# Patient Record
Sex: MALE
[De-identification: names, ages, dates, MRNs, and addresses within clinical notes are randomized per-mention and may not be internally consistent; named-entity substitution may affect disease eponyms.]

---

## 2017-03-16 ENCOUNTER — CHARTING TRANS (OUTPATIENT)
Dept: OTHER | Age: 72
End: 2017-03-16

## 2018-11-05 VITALS
SYSTOLIC BLOOD PRESSURE: 138 MMHG | WEIGHT: 290 LBS | TEMPERATURE: 98.4 F | HEIGHT: 70 IN | BODY MASS INDEX: 41.52 KG/M2 | RESPIRATION RATE: 20 BRPM | OXYGEN SATURATION: 98 % | HEART RATE: 76 BPM | DIASTOLIC BLOOD PRESSURE: 70 MMHG

## 2019-10-11 ENCOUNTER — APPOINTMENT (OUTPATIENT)
Dept: CT IMAGING | Age: 74
End: 2019-10-11
Attending: EMERGENCY MEDICINE
Payer: MEDICARE

## 2019-10-11 ENCOUNTER — APPOINTMENT (OUTPATIENT)
Dept: GENERAL RADIOLOGY | Age: 74
End: 2019-10-11
Attending: EMERGENCY MEDICINE
Payer: MEDICARE

## 2019-10-11 ENCOUNTER — HOSPITAL ENCOUNTER (EMERGENCY)
Age: 74
Discharge: HOME | End: 2019-10-11
Attending: EMERGENCY MEDICINE
Payer: MEDICARE

## 2019-10-11 VITALS
HEIGHT: 70 IN | HEART RATE: 74 BPM | OXYGEN SATURATION: 97 % | WEIGHT: 298 LBS | BODY MASS INDEX: 42.66 KG/M2 | SYSTOLIC BLOOD PRESSURE: 133 MMHG | DIASTOLIC BLOOD PRESSURE: 85 MMHG | RESPIRATION RATE: 16 BRPM

## 2019-10-11 DIAGNOSIS — S20.221A CONTUSION OF RIGHT SIDE OF BACK, INITIAL ENCOUNTER: Primary | ICD-10-CM

## 2019-10-11 PROCEDURE — 71101 X-RAY EXAM UNILAT RIBS/CHEST: CPT | Performed by: EMERGENCY MEDICINE

## 2019-10-11 PROCEDURE — 85730 THROMBOPLASTIN TIME PARTIAL: CPT | Performed by: EMERGENCY MEDICINE

## 2019-10-11 PROCEDURE — 85610 PROTHROMBIN TIME: CPT | Performed by: EMERGENCY MEDICINE

## 2019-10-11 PROCEDURE — 96374 THER/PROPH/DIAG INJ IV PUSH: CPT

## 2019-10-11 PROCEDURE — 71260 CT THORAX DX C+: CPT | Performed by: EMERGENCY MEDICINE

## 2019-10-11 PROCEDURE — 99285 EMERGENCY DEPT VISIT HI MDM: CPT

## 2019-10-11 PROCEDURE — 81003 URINALYSIS AUTO W/O SCOPE: CPT | Performed by: EMERGENCY MEDICINE

## 2019-10-11 PROCEDURE — 80053 COMPREHEN METABOLIC PANEL: CPT | Performed by: EMERGENCY MEDICINE

## 2019-10-11 PROCEDURE — 85025 COMPLETE CBC W/AUTO DIFF WBC: CPT | Performed by: EMERGENCY MEDICINE

## 2019-10-11 PROCEDURE — 96376 TX/PRO/DX INJ SAME DRUG ADON: CPT

## 2019-10-11 PROCEDURE — 96375 TX/PRO/DX INJ NEW DRUG ADDON: CPT

## 2019-10-11 PROCEDURE — 74177 CT ABD & PELVIS W/CONTRAST: CPT | Performed by: EMERGENCY MEDICINE

## 2019-10-11 RX ORDER — DIAZEPAM 5 MG/1
5 TABLET ORAL EVERY 6 HOURS PRN
COMMUNITY
End: 2019-10-23

## 2019-10-11 RX ORDER — KETOROLAC TROMETHAMINE 10 MG/1
10 TABLET, FILM COATED ORAL EVERY 6 HOURS PRN
Qty: 20 TABLET | Refills: 0 | Status: SHIPPED | OUTPATIENT
Start: 2019-10-11 | End: 2019-10-18

## 2019-10-11 RX ORDER — HYDROMORPHONE HYDROCHLORIDE 1 MG/ML
0.5 INJECTION, SOLUTION INTRAMUSCULAR; INTRAVENOUS; SUBCUTANEOUS EVERY 30 MIN PRN
Status: DISCONTINUED | OUTPATIENT
Start: 2019-10-11 | End: 2019-10-11

## 2019-10-11 RX ORDER — KETOROLAC TROMETHAMINE 30 MG/ML
15 INJECTION, SOLUTION INTRAMUSCULAR; INTRAVENOUS ONCE
Status: COMPLETED | OUTPATIENT
Start: 2019-10-11 | End: 2019-10-11

## 2019-10-11 RX ORDER — ONDANSETRON 2 MG/ML
4 INJECTION INTRAMUSCULAR; INTRAVENOUS ONCE
Status: COMPLETED | OUTPATIENT
Start: 2019-10-11 | End: 2019-10-11

## 2019-10-11 RX ORDER — HYDROCODONE BITARTRATE AND ACETAMINOPHEN 10; 325 MG/1; MG/1
1 TABLET ORAL EVERY 6 HOURS PRN
Qty: 20 TABLET | Refills: 0 | Status: SHIPPED | OUTPATIENT
Start: 2019-10-11 | End: 2019-10-18

## 2019-10-11 RX ORDER — METHYLPREDNISOLONE 4 MG/1
TABLET ORAL
Qty: 1 PACKAGE | Refills: 0 | Status: SHIPPED | OUTPATIENT
Start: 2019-10-11 | End: 2019-10-23

## 2019-10-11 NOTE — ED PROVIDER NOTES
Patient Seen in: THE [facility] Emergency Department In [place]      History   Patient presents with:  Back Pain (musculoskeletal)    Stated Complaint: back pain after fall last yesterday     HPI    Patient presents with back pain after a fall.   The patient s Alcohol use: Yes      Alcohol/week: 12.0 standard drinks      Types: 12 Standard drinks or equivalent per week      Frequency: 2-3 times a week    Drug use:  No             Review of Systems    Positive for stated complaint: back pain after fall last yester limits   PTT, ACTIVATED - Abnormal; Notable for the following components:    PTT 37.8 (*)     All other components within normal limits   CBC WITH DIFFERENTIAL WITH PLATELET    Narrative:      The following orders were created for panel order CBC WITH DIFFE visible pulmonary arterial thrombus or attenuation. ABDOMEN/PELVIS: LIVER:  No enlargement, atrophy, abnormal density, or significant focal lesion. BILIARY:  No visible dilatation or calcification.   PANCREAS:  No lesion, fluid collection, ductal dilatat pain since falling. FINDINGS:  Wall normal heart size and pulmonary vascularity. Mildly tortuous and calcified aorta. No focal pulmonary opacity. Thoracic dextroscoliosis and degenerative changes. No acute right rib fracture or osseous abnormality. needed for stability with ambulation. He was offered observation admission but would prefer to go home. He has an appointment with Dr. Sherley Rain on Tuesday already for a follow-up. He will return here in the meantime for new or worsening symptoms.      Dis

## 2019-10-11 NOTE — ED INITIAL ASSESSMENT (HPI)
Pt states yest tripped in yard over unequal sidewalk co upper back pain stefani with movement and pain occ across upper chest

## 2019-10-23 ENCOUNTER — APPOINTMENT (OUTPATIENT)
Dept: GENERAL RADIOLOGY | Age: 74
End: 2019-10-23
Attending: EMERGENCY MEDICINE
Payer: MEDICARE

## 2019-10-23 ENCOUNTER — HOSPITAL ENCOUNTER (EMERGENCY)
Age: 74
Discharge: HOME | End: 2019-10-23
Attending: EMERGENCY MEDICINE
Payer: MEDICARE

## 2019-10-23 ENCOUNTER — APPOINTMENT (OUTPATIENT)
Dept: ULTRASOUND IMAGING | Age: 74
End: 2019-10-23
Attending: EMERGENCY MEDICINE
Payer: MEDICARE

## 2019-10-23 VITALS
BODY MASS INDEX: 41.37 KG/M2 | DIASTOLIC BLOOD PRESSURE: 70 MMHG | RESPIRATION RATE: 23 BRPM | WEIGHT: 289 LBS | HEIGHT: 70 IN | TEMPERATURE: 97 F | SYSTOLIC BLOOD PRESSURE: 127 MMHG | OXYGEN SATURATION: 100 % | HEART RATE: 85 BPM

## 2019-10-23 DIAGNOSIS — M79.89 LEFT LEG SWELLING: Primary | ICD-10-CM

## 2019-10-23 DIAGNOSIS — S72.492A AVULSION FRACTURE OF FEMORAL CONDYLE, LEFT, CLOSED, INITIAL ENCOUNTER (HCC): ICD-10-CM

## 2019-10-23 PROCEDURE — 99284 EMERGENCY DEPT VISIT MOD MDM: CPT

## 2019-10-23 PROCEDURE — 96365 THER/PROPH/DIAG IV INF INIT: CPT

## 2019-10-23 PROCEDURE — 73552 X-RAY EXAM OF FEMUR 2/>: CPT | Performed by: EMERGENCY MEDICINE

## 2019-10-23 PROCEDURE — 80048 BASIC METABOLIC PNL TOTAL CA: CPT | Performed by: EMERGENCY MEDICINE

## 2019-10-23 PROCEDURE — 73590 X-RAY EXAM OF LOWER LEG: CPT | Performed by: EMERGENCY MEDICINE

## 2019-10-23 PROCEDURE — 36415 COLL VENOUS BLD VENIPUNCTURE: CPT

## 2019-10-23 PROCEDURE — 73560 X-RAY EXAM OF KNEE 1 OR 2: CPT | Performed by: EMERGENCY MEDICINE

## 2019-10-23 PROCEDURE — 85025 COMPLETE CBC W/AUTO DIFF WBC: CPT | Performed by: EMERGENCY MEDICINE

## 2019-10-23 PROCEDURE — 85610 PROTHROMBIN TIME: CPT | Performed by: EMERGENCY MEDICINE

## 2019-10-23 PROCEDURE — 87040 BLOOD CULTURE FOR BACTERIA: CPT | Performed by: EMERGENCY MEDICINE

## 2019-10-23 PROCEDURE — 93971 EXTREMITY STUDY: CPT | Performed by: EMERGENCY MEDICINE

## 2019-10-23 RX ORDER — CEPHALEXIN 500 MG/1
500 CAPSULE ORAL 4 TIMES DAILY
Qty: 28 CAPSULE | Refills: 0 | Status: SHIPPED | OUTPATIENT
Start: 2019-10-23 | End: 2019-10-30

## 2019-10-23 RX ORDER — PHENOBARBITAL SODIUM 130 MG/ML
300 INJECTION INTRAMUSCULAR ONCE
Status: DISCONTINUED | OUTPATIENT
Start: 2019-10-23 | End: 2019-10-23

## 2019-10-23 NOTE — ED INITIAL ASSESSMENT (HPI)
Pt was sent over from Dr. Melba Juarez office to R/O DVT to left leg. Pt fell 10/9/19 was taken by ambulance to [address]. xrays of hip and ribs at time of injury.

## 2019-10-23 NOTE — ED PROVIDER NOTES
Patient Seen in: THE [facility] Emergency Department In [place]      History   Patient presents with:  Deep Vein Thrombosis (cardiovascular)    Stated Complaint: SENT FROM PRIMARY MD FOR STAT LEFT LEG [place]     HPI    I note an ER visit from about just less th 131.1 kg   SpO2 100%   BMI 41.47 kg/m²         Physical Exam  General: The patient is awake, alert, conversant. Patient answers questions quickly and appropriately.   Eyes: sclera white, conjunctiva pink and moist.  Lids and lashes are normal.  Nose: Unrem Abnormality         Status                     ---------                               -----------         ------                     CBC W/ DIFFERENTIAL[[id number]]          Abnormal            Final result                 Pleas dislocation. CBC shows normal white count, hemoglobin, and platelets with no predominance of neutrophils on differential.  Metabolic panel shows mild hyponatremia. There is normal creatinine.   INR therapeutic at 2.07    Venous Doppler leg  CONCLUSION:

## 2021-06-02 ENCOUNTER — HOSPITAL ENCOUNTER (EMERGENCY)
Age: 76
Discharge: HOME | End: 2021-06-02
Attending: EMERGENCY MEDICINE
Payer: MEDICARE

## 2021-06-02 VITALS
SYSTOLIC BLOOD PRESSURE: 156 MMHG | DIASTOLIC BLOOD PRESSURE: 87 MMHG | HEART RATE: 97 BPM | RESPIRATION RATE: 20 BRPM | WEIGHT: 290 LBS | BODY MASS INDEX: 41.52 KG/M2 | TEMPERATURE: 98 F | HEIGHT: 70 IN | OXYGEN SATURATION: 96 %

## 2021-06-02 DIAGNOSIS — L03.115 CELLULITIS OF RIGHT LOWER EXTREMITY: Primary | ICD-10-CM

## 2021-06-02 PROCEDURE — 99283 EMERGENCY DEPT VISIT LOW MDM: CPT

## 2021-06-02 RX ORDER — CEFADROXIL 500 MG/1
500 CAPSULE ORAL 2 TIMES DAILY
Qty: 20 CAPSULE | Refills: 0 | Status: SHIPPED | OUTPATIENT
Start: 2021-06-02 | End: 2021-06-12

## 2021-06-02 NOTE — ED PROVIDER NOTES
Patient Seen in: [facility] Emergency Department In [facility]      History   Patient presents with:  Cellulitis  Fall    Stated Complaint: fell on friday, now redness to shin and leg spreading    HPI/Subjective:   HPI    Emily Martinez is a 50-year-old male who pres Alcohol/week: 12.0 standard drinks      Types: 12 Standard drinks or equivalent per week    Drug use:  No             Review of Systems    Positive for stated complaint: fell on friday, now redness to shin and leg spreading  Other systems are as noted in HP per the patient is sitting up on exam cart, is nontoxic in appearance and is in no acute distress. Lower extremities evaluated and obvious cellulitis is noted without any tracking or other concerning findings. Distal CMS intact.       This case is discuss

## 2021-06-02 NOTE — ED INITIAL ASSESSMENT (HPI)
States fell on Friday and struck right lower leg/shin on edge of golfcart. No loc today noted increased redness to lower let. Right lower leg is red, warm to touch, large scabbed area noted.

## 2021-08-18 PROBLEM — I48.21 PERMANENT ATRIAL FIBRILLATION (HCC): Status: ACTIVE | Noted: 2021-08-18

## 2021-08-18 PROBLEM — I10 ESSENTIAL HYPERTENSION: Status: ACTIVE | Noted: 2021-08-18

## 2024-01-13 ENCOUNTER — APPOINTMENT (OUTPATIENT)
Dept: GENERAL RADIOLOGY | Age: 79
End: 2024-01-13
Payer: MEDICARE

## 2024-01-13 ENCOUNTER — HOSPITAL ENCOUNTER (EMERGENCY)
Age: 79
Discharge: HOME | End: 2024-01-13
Payer: MEDICARE

## 2024-01-13 VITALS
TEMPERATURE: 98 F | HEIGHT: 70 IN | WEIGHT: 280 LBS | BODY MASS INDEX: 40.09 KG/M2 | DIASTOLIC BLOOD PRESSURE: 71 MMHG | OXYGEN SATURATION: 95 % | SYSTOLIC BLOOD PRESSURE: 126 MMHG | HEART RATE: 64 BPM | RESPIRATION RATE: 16 BRPM

## 2024-01-13 DIAGNOSIS — U07.1 COVID-19 VIRUS INFECTION: Primary | ICD-10-CM

## 2024-01-13 LAB
POCT INFLUENZA A: NEGATIVE
POCT INFLUENZA B: NEGATIVE
SARS-COV-2 RNA RESP QL NAA+PROBE: DETECTED

## 2024-01-13 PROCEDURE — 71046 X-RAY EXAM CHEST 2 VIEWS: CPT

## 2024-01-13 PROCEDURE — 87502 INFLUENZA DNA AMP PROBE: CPT

## 2024-01-13 PROCEDURE — 99283 EMERGENCY DEPT VISIT LOW MDM: CPT

## 2024-01-13 NOTE — ED PROVIDER NOTES
History     Chief Complaint   Patient presents with    Cough/URI       Subjective:   HPI    Adi Thomas, 78 year old male with notable medical history of ENDY, HTN who presents with nasal congestion and mild HA. PATIENT reports s/s started a couple days ago and he reports his wife has had similar symptoms. Denies fever, chills, cough, SOB, n/v/d, severe fatigue.         Objective:   Past Medical History:   Diagnosis Date    Cellulitis     Chronic venous insufficiency     Essential hypertension     Gout     Obesity     PAD (peripheral artery disease) (HCC)     Paroxysmal atrial fibrillation (HCC)     Sleep apnea               Past Surgical History:   Procedure Laterality Date    APPENDECTOMY      CATARACT Bilateral     HIP REPLACEMENT SURGERY Left 2014    KNEE REPLACEMENT SURGERY Right 2001    KNEE REPLACEMENT SURGERY Left 2010                Social History     Socioeconomic History    Marital status:    Tobacco Use    Smoking status: Former     Types: Cigarettes     Quit date:      Years since quittin.0    Smokeless tobacco: Never   Substance and Sexual Activity    Alcohol use: Yes     Alcohol/week: 12.0 standard drinks of alcohol     Types: 12 Standard drinks or equivalent per week    Drug use: No              Review of Systems   Constitutional:  Negative for chills and fever.   HENT:  Positive for congestion.    Respiratory:  Negative for shortness of breath.    All other systems reviewed and are negative.        Constitutional and vital signs reviewed.      All other systems reviewed and negative except as noted above.    I have reviewed the family history, social history, allergies, and outpatient medications.     History reviewed from EMR: Encounters, problem list, allergies, medications      Physical Exam     ED Triage Vitals [24 1344]   /71   Pulse 64   Resp 16   Temp 98.4 °F (36.9 °C)   Temp src Temporal   SpO2 95 %   O2 Device None (Room air)       Current:/71    Pulse 64   Temp 98.4 °F (36.9 °C) (Temporal)   Resp 16   Ht 177.8 cm (5' 10\")   Wt 127 kg   SpO2 95%   BMI 40.18 kg/m²       Physical Exam  Vitals and nursing note reviewed.   Constitutional:       General: He is not in acute distress.     Appearance: Normal appearance. He is morbidly obese. He is not ill-appearing or toxic-appearing.   HENT:      Head: Normocephalic and atraumatic.      Right Ear: External ear normal.      Left Ear: External ear normal.      Nose: Nose normal. No congestion or rhinorrhea.      Mouth/Throat:      Mouth: Mucous membranes are moist.   Eyes:      Extraocular Movements: Extraocular movements intact.      Conjunctiva/sclera: Conjunctivae normal.      Pupils: Pupils are equal, round, and reactive to light.   Cardiovascular:      Rate and Rhythm: Normal rate and regular rhythm.      Pulses: Normal pulses.   Pulmonary:      Effort: Pulmonary effort is normal. No respiratory distress.      Breath sounds: No wheezing or rhonchi.      Comments: LS clear with mildly diminished sounds to LLL compared to others  Musculoskeletal:         General: No swelling, tenderness or signs of injury. Normal range of motion.      Cervical back: Normal range of motion.   Skin:     General: Skin is warm and dry.      Capillary Refill: Capillary refill takes less than 2 seconds.   Neurological:      General: No focal deficit present.      Mental Status: He is alert and oriented to person, place, and time. Mental status is at baseline.   Psychiatric:         Mood and Affect: Mood normal.         Behavior: Behavior normal. Behavior is cooperative.         Thought Content: Thought content normal.         Judgment: Judgment normal.            ED Course     Labs Reviewed   RAPID SARS-COV-2 BY PCR - Abnormal; Notable for the following components:       Result Value    Rapid SARS-CoV-2 by PCR Detected (*)     All other components within normal limits   POCT FLU TEST - Normal    Narrative:     This assay is a  rapid molecular in vitro test utilizing nucleic acid amplification of influenza A and B viral RNA.     XR CHEST PA + LAT CHEST (CPT=71046)   Final Result   PROCEDURE:  XR CHEST PA + LAT CHEST (CPT=71046)       INDICATIONS:  chest congestion       COMPARISON:  [facility], XR, XR RIBS WITH CHEST (3 VIEWS), RIGHT     (CPT=71101), 10/11/2019, 5:18 PM.       TECHNIQUE:  PA and lateral chest radiographs were obtained.       PATIENT STATED HISTORY: (As transcribed by Technologist)  Patient states    cough and congestion without a fever for the past three days. Patient    states no known history of any heart or lung medical conditions that he is    aware of.             FINDINGS:     LUNGS:  No focal consolidation.  Normal vascularity.   CARDIAC:  Normal size cardiac silhouette.   MEDIASTINUM:  Normal.   PLEURA:  Normal.  No pleural effusions.   BONES:  Normal for age.                         =====   CONCLUSION:  Negative exam.           LOCATION:  [facility]           Dictated by (CST): George Bucio DO on 1/13/2024 at 2:49 PM        Finalized by (CST): George Bucio DO on 1/13/2024 at 2:50 PM             Vitals:    01/13/24 1343 01/13/24 1344   BP:  126/71   Pulse:  64   Resp:  16   Temp:  98.4 °F (36.9 °C)   TempSrc:  Temporal   SpO2:  95%   Weight: 127 kg    Height: 177.8 cm (5' 10\")             ZOILA        Adi Thomas, 78 year old male with medical history as noted above who presents with nasal congestion and mild HA   - Patient in NAD, VSS   - HPI and exam c/w viral etiology   - Covid positive   - CXR ordered to r/o additional pulmonary process   - Discussed pros / cons of Paxlovid and via shared decision making will not prescribe        ** See ED course for additional information on care provided / interventions / notable events throughout patient's encounter.    ** See below for home care instructions (if applicable)    ED Course as of 01/13/24 1545  ------------------------------------------------------------  Time:  01/13 1529  Comment: CXR w/o acute process  Supportive care and infection control measures discussed  F/u with primary care provider as needed  Ambulatory out of ED with son with steady gait       I have independently reviewed the radiology images, clinical lab results, and ECG tracings as described above (if applicable)        Medical Decision Making  Amount and/or Complexity of Data Reviewed  Labs: ordered. Decision-making details documented in ED Course.  Radiology: ordered and independent interpretation performed. Decision-making details documented in ED Course.    Risk  OTC drugs.        Disposition and Plan     Clinical Impression:  1. COVID-19 virus infection         Disposition:  Discharge  1/13/2024  3:29 pm    Follow-up:  Kvng Eid MD  [id number]  [facility] [id number] [phone]    Follow up  As needed          Medications Prescribed:  Discharge Medication List as of 1/13/2024  3:33 PM          The above patient (and/or guardian) was made aware that an appropriate evaluation has been performed, and that no additional testing is required at this time. In my medical judgment, there is currently no evidence of an immediate life-threatening or surgical condition, therefore discharge is indicated at this time. The patient (and/or guardian) was advised that a small risk still exists that a serious condition could develop. The patient was instructed to arrange close follow-up with their primary care provider (or the referral provider given today). The patient received written and verbal instructions regarding their condition / concerns, demonstrated understanding, and is agreement with the outpatient treatment plan.        Home care instructions:      Upper respiratory infection supportive care measures to try as applicable:  General:   - Wash hands often   - Disinfect your environment, linens, electronics, etc.   - Drink plenty of fluids (water, Pedialyte, etc.)   - Get plenty of rest and sleep  with head elevated to help with sinus drainage and throat irritation   - Avoid having air blow on your face as this can worsen congestion / cough   - Do not share utensils or drinks   - Alternate Ibuprofen (adult: 600mg) and Tylenol (adult: 650-1000mg) as needed for pain / body aches / fever   - Symptoms may take a few weeks to resolve   - You may benefit from taking a daily multivitamin   - You may benefit from Zinc (~20mg) a couple times a week   - You may benefit from Vitamin D daily (~2000u)   - Change toothbrush    Sore throat:   - Salt water gargles throughout the day for sore throat   - Cepacol lozenges as needed for sore throat    Cough / Sinus:   - You may benefit from spoonfuls (and/or added to warm drinks) of honey throughout the day for cough   - You may benefit from taking a decongestant (e.g. Sudafed - pseudoephedrine [behind the pharmacy counter]) (may temporarily elevate your heart rate and blood pressure)   - You may benefit from using a humidifier and/or steam showers   - You may benefit from Flonase nasal spray daily   - You may benefit from taking a daily allergy medication (e.g. Zyrtec, Xyzal, etc.)   - You may benefit from boiling water with lemon and cayenne pepper, then breathing in the steam (you can cover your head with a towel to help funnel the steam)      Flaquito Cordova, DNP, APRN, AGACNP-BC, FNP-C, CNL  Adult-Gerontology Acute Care & Family Nurse Practitioner  [facility]

## 2024-01-13 NOTE — DISCHARGE INSTRUCTIONS
Upper respiratory infection supportive care measures to try as applicable:  General:   - Wash hands often   - Disinfect your environment, linens, electronics, etc.   - Drink plenty of fluids (water, Pedialyte, etc.)   - Get plenty of rest and sleep with head elevated to help with sinus drainage and throat irritation   - Avoid having air blow on your face as this can worsen congestion / cough   - Do not share utensils or drinks   - Alternate Ibuprofen (adult: 600mg) and Tylenol (adult: 650-1000mg) as needed for pain / body aches / fever   - Symptoms may take a few weeks to resolve   - You may benefit from taking a daily multivitamin   - You may benefit from Zinc (~20mg) a couple times a week   - You may benefit from Vitamin D daily (~2000u)   - Change toothbrush    Sore throat:   - Salt water gargles throughout the day for sore throat   - Cepacol lozenges as needed for sore throat    Cough / Sinus:   - You may benefit from spoonfuls (and/or added to warm drinks) of honey throughout the day for cough   - You may benefit from taking a decongestant (e.g. Sudafed - pseudoephedrine [behind the pharmacy counter]) (may temporarily elevate your heart rate and blood pressure)   - You may benefit from using a humidifier and/or steam showers   - You may benefit from Flonase nasal spray daily   - You may benefit from taking a daily allergy medication (e.g. Zyrtec, Xyzal, etc.)   - You may benefit from boiling water with lemon and cayenne pepper, then breathing in the steam (you can cover your head with a towel to help funnel the steam)

## 2024-12-29 ENCOUNTER — HOSPITAL ENCOUNTER (OUTPATIENT)
Facility: HOSPITAL | Age: 79
Setting detail: OBSERVATION
Discharge: HOME | End: 2024-12-31
Attending: EMERGENCY MEDICINE | Admitting: HOSPITALIST
Payer: MEDICARE

## 2024-12-29 ENCOUNTER — APPOINTMENT (OUTPATIENT)
Dept: GENERAL RADIOLOGY | Age: 79
End: 2024-12-29
Attending: EMERGENCY MEDICINE
Payer: MEDICARE

## 2024-12-29 DIAGNOSIS — R07.9 CHEST PAIN OF UNCERTAIN ETIOLOGY: Primary | ICD-10-CM

## 2024-12-29 PROBLEM — R73.9 HYPERGLYCEMIA: Status: ACTIVE | Noted: 2024-12-29

## 2024-12-29 PROBLEM — R79.89 AZOTEMIA: Status: ACTIVE | Noted: 2024-12-29

## 2024-12-29 LAB
ALBUMIN SERPL-MCNC: 4.3 G/DL (ref 3.2–4.8)
ALBUMIN/GLOB SERPL: 1.6 {RATIO} (ref 1–2)
ALP LIVER SERPL-CCNC: 88 U/L
ALT SERPL-CCNC: 18 U/L
ANION GAP SERPL CALC-SCNC: 6 MMOL/L (ref 0–18)
AST SERPL-CCNC: 23 U/L (ref ?–34)
BASOPHILS # BLD AUTO: 0.03 X10(3) UL (ref 0–0.2)
BASOPHILS NFR BLD AUTO: 0.4 %
BILIRUB SERPL-MCNC: 0.5 MG/DL (ref 0.2–1.1)
BUN BLD-MCNC: 19 MG/DL (ref 9–23)
CALCIUM BLD-MCNC: 9.3 MG/DL (ref 8.7–10.4)
CHLORIDE SERPL-SCNC: 104 MMOL/L (ref 98–112)
CO2 SERPL-SCNC: 29 MMOL/L (ref 21–32)
CREAT BLD-MCNC: 0.88 MG/DL
EGFRCR SERPLBLD CKD-EPI 2021: 87 ML/MIN/1.73M2 (ref 60–?)
EOSINOPHIL # BLD AUTO: 0.13 X10(3) UL (ref 0–0.7)
EOSINOPHIL NFR BLD AUTO: 1.6 %
ERYTHROCYTE [DISTWIDTH] IN BLOOD BY AUTOMATED COUNT: 14.4 %
GLOBULIN PLAS-MCNC: 2.7 G/DL (ref 2–3.5)
GLUCOSE BLD-MCNC: 120 MG/DL (ref 70–99)
HCT VFR BLD AUTO: 42.6 %
HGB BLD-MCNC: 15 G/DL
IMM GRANULOCYTES # BLD AUTO: 0.03 X10(3) UL (ref 0–1)
IMM GRANULOCYTES NFR BLD: 0.4 %
LYMPHOCYTES # BLD AUTO: 2 X10(3) UL (ref 1–4)
LYMPHOCYTES NFR BLD AUTO: 25 %
MCH RBC QN AUTO: 31.4 PG (ref 26–34)
MCHC RBC AUTO-ENTMCNC: 35.2 G/DL (ref 31–37)
MCV RBC AUTO: 89.1 FL
MONOCYTES # BLD AUTO: 0.64 X10(3) UL (ref 0.1–1)
MONOCYTES NFR BLD AUTO: 8 %
NEUTROPHILS # BLD AUTO: 5.16 X10 (3) UL (ref 1.5–7.7)
NEUTROPHILS # BLD AUTO: 5.16 X10(3) UL (ref 1.5–7.7)
NEUTROPHILS NFR BLD AUTO: 64.6 %
OSMOLALITY SERPL CALC.SUM OF ELEC: 291 MOSM/KG (ref 275–295)
PLATELET # BLD AUTO: 220 10(3)UL (ref 150–450)
POTASSIUM SERPL-SCNC: 3.6 MMOL/L (ref 3.5–5.1)
PROT SERPL-MCNC: 7 G/DL (ref 5.7–8.2)
RBC # BLD AUTO: 4.78 X10(6)UL
SODIUM SERPL-SCNC: 139 MMOL/L (ref 136–145)
TROPONIN I SERPL HS-MCNC: 25 NG/L
WBC # BLD AUTO: 8 X10(3) UL (ref 4–11)

## 2024-12-29 PROCEDURE — 99223 1ST HOSP IP/OBS HIGH 75: CPT | Performed by: STUDENT IN AN ORGANIZED HEALTH CARE EDUCATION/TRAINING PROGRAM

## 2024-12-29 PROCEDURE — 71045 X-RAY EXAM CHEST 1 VIEW: CPT | Performed by: EMERGENCY MEDICINE

## 2024-12-29 RX ORDER — ECHINACEA PURPUREA EXTRACT 125 MG
1 TABLET ORAL
Status: DISCONTINUED | OUTPATIENT
Start: 2024-12-29 | End: 2024-12-31

## 2024-12-29 RX ORDER — ASPIRIN 81 MG/1
324 TABLET, CHEWABLE ORAL ONCE
Status: COMPLETED | OUTPATIENT
Start: 2024-12-29 | End: 2024-12-29

## 2024-12-29 RX ORDER — POLYETHYLENE GLYCOL 3350 17 G/17G
17 POWDER, FOR SOLUTION ORAL DAILY PRN
Status: DISCONTINUED | OUTPATIENT
Start: 2024-12-29 | End: 2024-12-31

## 2024-12-29 RX ORDER — SODIUM PHOSPHATE, DIBASIC AND SODIUM PHOSPHATE, MONOBASIC 7; 19 G/230ML; G/230ML
1 ENEMA RECTAL ONCE AS NEEDED
Status: DISCONTINUED | OUTPATIENT
Start: 2024-12-29 | End: 2024-12-31

## 2024-12-29 RX ORDER — ACETAMINOPHEN 500 MG
500 TABLET ORAL EVERY 4 HOURS PRN
Status: DISCONTINUED | OUTPATIENT
Start: 2024-12-29 | End: 2024-12-31

## 2024-12-29 RX ORDER — NITROGLYCERIN 0.4 MG/1
0.4 TABLET SUBLINGUAL EVERY 5 MIN PRN
Status: DISCONTINUED | OUTPATIENT
Start: 2024-12-29 | End: 2024-12-31

## 2024-12-29 RX ORDER — SENNOSIDES 8.6 MG
17.2 TABLET ORAL NIGHTLY PRN
Status: DISCONTINUED | OUTPATIENT
Start: 2024-12-29 | End: 2024-12-31

## 2024-12-29 RX ORDER — ONDANSETRON 2 MG/ML
4 INJECTION INTRAMUSCULAR; INTRAVENOUS EVERY 6 HOURS PRN
Status: DISCONTINUED | OUTPATIENT
Start: 2024-12-29 | End: 2024-12-31

## 2024-12-29 RX ORDER — METOCLOPRAMIDE HYDROCHLORIDE 5 MG/ML
10 INJECTION INTRAMUSCULAR; INTRAVENOUS EVERY 8 HOURS PRN
Status: DISCONTINUED | OUTPATIENT
Start: 2024-12-29 | End: 2024-12-31

## 2024-12-29 RX ORDER — BISACODYL 10 MG
10 SUPPOSITORY, RECTAL RECTAL
Status: DISCONTINUED | OUTPATIENT
Start: 2024-12-29 | End: 2024-12-31

## 2024-12-29 RX ORDER — BENZONATATE 100 MG/1
200 CAPSULE ORAL 3 TIMES DAILY PRN
Status: DISCONTINUED | OUTPATIENT
Start: 2024-12-29 | End: 2024-12-31

## 2024-12-29 RX ORDER — MORPHINE SULFATE 2 MG/ML
2 INJECTION, SOLUTION INTRAMUSCULAR; INTRAVENOUS EVERY 4 HOURS PRN
Status: DISCONTINUED | OUTPATIENT
Start: 2024-12-29 | End: 2024-12-31

## 2024-12-30 LAB
ANION GAP SERPL CALC-SCNC: 7 MMOL/L (ref 0–18)
ATRIAL RATE: 41 BPM
BASOPHILS # BLD AUTO: 0.03 X10(3) UL (ref 0–0.2)
BASOPHILS NFR BLD AUTO: 0.4 %
BUN BLD-MCNC: 17 MG/DL (ref 9–23)
CALCIUM BLD-MCNC: 8.8 MG/DL (ref 8.7–10.4)
CHLORIDE SERPL-SCNC: 106 MMOL/L (ref 98–112)
CHOLEST SERPL-MCNC: 142 MG/DL (ref ?–200)
CO2 SERPL-SCNC: 28 MMOL/L (ref 21–32)
CREAT BLD-MCNC: 0.74 MG/DL
EGFRCR SERPLBLD CKD-EPI 2021: 92 ML/MIN/1.73M2 (ref 60–?)
EOSINOPHIL # BLD AUTO: 0.14 X10(3) UL (ref 0–0.7)
EOSINOPHIL NFR BLD AUTO: 2.1 %
ERYTHROCYTE [DISTWIDTH] IN BLOOD BY AUTOMATED COUNT: 14.2 %
GLUCOSE BLD-MCNC: 102 MG/DL (ref 70–99)
HCT VFR BLD AUTO: 40 %
HDLC SERPL-MCNC: 34 MG/DL (ref 40–59)
HGB BLD-MCNC: 13.6 G/DL
IMM GRANULOCYTES # BLD AUTO: 0.02 X10(3) UL (ref 0–1)
IMM GRANULOCYTES NFR BLD: 0.3 %
LDLC SERPL CALC-MCNC: 92 MG/DL (ref ?–100)
LYMPHOCYTES # BLD AUTO: 2.04 X10(3) UL (ref 1–4)
LYMPHOCYTES NFR BLD AUTO: 30.1 %
MCH RBC QN AUTO: 29.8 PG (ref 26–34)
MCHC RBC AUTO-ENTMCNC: 34 G/DL (ref 31–37)
MCV RBC AUTO: 87.7 FL
MONOCYTES # BLD AUTO: 0.72 X10(3) UL (ref 0.1–1)
MONOCYTES NFR BLD AUTO: 10.6 %
NEUTROPHILS # BLD AUTO: 3.82 X10 (3) UL (ref 1.5–7.7)
NEUTROPHILS # BLD AUTO: 3.82 X10(3) UL (ref 1.5–7.7)
NEUTROPHILS NFR BLD AUTO: 56.5 %
NONHDLC SERPL-MCNC: 108 MG/DL (ref ?–130)
OSMOLALITY SERPL CALC.SUM OF ELEC: 294 MOSM/KG (ref 275–295)
PLATELET # BLD AUTO: 193 10(3)UL (ref 150–450)
POTASSIUM SERPL-SCNC: 3.5 MMOL/L (ref 3.5–5.1)
Q-T INTERVAL: 450 MS
QRS DURATION: 134 MS
QTC CALCULATION (BEZET): 471 MS
R AXIS: -27 DEGREES
RBC # BLD AUTO: 4.56 X10(6)UL
SODIUM SERPL-SCNC: 141 MMOL/L (ref 136–145)
T AXIS: -2 DEGREES
TRIGL SERPL-MCNC: 80 MG/DL (ref 30–149)
TROPONIN I SERPL HS-MCNC: 27 NG/L
TROPONIN I SERPL HS-MCNC: 28 NG/L
VENTRICULAR RATE: 66 BPM
VLDLC SERPL CALC-MCNC: 13 MG/DL (ref 0–30)
WBC # BLD AUTO: 6.8 X10(3) UL (ref 4–11)

## 2024-12-30 PROCEDURE — 99232 SBSQ HOSP IP/OBS MODERATE 35: CPT | Performed by: INTERNAL MEDICINE

## 2024-12-30 RX ORDER — AMLODIPINE BESYLATE 5 MG/1
5 TABLET ORAL DAILY
Status: DISCONTINUED | OUTPATIENT
Start: 2024-12-30 | End: 2024-12-31

## 2024-12-30 RX ORDER — ATENOLOL 25 MG/1
25 TABLET ORAL
Status: DISCONTINUED | OUTPATIENT
Start: 2024-12-30 | End: 2024-12-31

## 2024-12-30 RX ORDER — KETOROLAC TROMETHAMINE 15 MG/ML
15 INJECTION, SOLUTION INTRAMUSCULAR; INTRAVENOUS ONCE
Status: COMPLETED | OUTPATIENT
Start: 2024-12-30 | End: 2024-12-30

## 2024-12-30 RX ORDER — ALLOPURINOL 300 MG/1
300 TABLET ORAL DAILY
Status: DISCONTINUED | OUTPATIENT
Start: 2024-12-30 | End: 2024-12-31

## 2024-12-30 NOTE — ED PROVIDER NOTES
Patient Seen in: [facility] In [place]      History     Chief Complaint   Patient presents with    Chest Pain Angina     Stated Complaint: chest pain x 30 minutes    Subjective:   HPI      79-year-old with history of hypertension, gout, elevated BMI, peripheral vascular disease, paroxysmal atrial fibrillation on rivaroxaban, sleep apnea presents with family for evaluation of chest pain.  Some of the history is provided by his wife.  Patient presents for chest pain. Started tonight around 1900. Is substernal, more on the right. Doesn't radiate now, was in the area of his shoulder blades before. Is burning in nature. Was initially constant, now coming and going. Hurts to turn. Doesn't feel short of breath. No cold sweats. No nausea or vomiting. No cough, fever, hemoptysis. No leg swelling. Currently rates it as 3/10, was 10/10 at home.   Outpatient records reviewed.  Nuclear stress test in 2019 was normal with normal ejection fraction.  Cardiology note from 2023 notes permanent atrial fibrillation and hypertension, no mention of coronary artery disease.  Objective:     Past Medical History:    Cellulitis    Chronic venous insufficiency    Essential hypertension    Gout    Obesity    PAD (peripheral artery disease) (HCC)    Paroxysmal atrial fibrillation (HCC)    Sleep apnea              Past Surgical History:   Procedure Laterality Date    Appendectomy      Cataract Bilateral     Hip replacement surgery Left 2014    Knee replacement surgery Right 2001    Knee replacement surgery Left                 Social History     Socioeconomic History    Marital status:    Tobacco Use    Smoking status: Former     Current packs/day: 0.00     Types: Cigarettes     Quit date:      Years since quittin.0    Smokeless tobacco: Never   Substance and Sexual Activity    Alcohol use: Yes     Alcohol/week: 12.0 standard drinks of alcohol     Types: 12 Standard drinks or equivalent per week     Drug use: No                  Physical Exam     ED Triage Vitals [12/29/24 2041]   /68   Pulse 63   Resp 16   Temp 98.1 °F (36.7 °C)   Temp src Temporal   SpO2 96 %   O2 Device None (Room air)       Current Vitals:   Vital Signs  BP: 157/88  Pulse: 59  Resp: 21  Temp: 98.2 °F (36.8 °C)  Temp src: Oral  MAP (mmHg): (!) 106    Oxygen Therapy  SpO2: 98 %  O2 Device: None (Room air)  Mode: AutoPAP  O2 Flow Rate (L/min): 0 L/min  Pulse Oximetry Type: Continuous  Oximetry Probe Site Changed: Yes  Pulse Ox Probe Location: Left hand        Physical Exam  General: Patient is awake, alert in no acute distress.   HEENT:  Sclera are not icteric.  Conjunctivae within normal limits.  Mucous members are moist.   Cardiovascular: Irregularly irregular no ST elevation or depression rhythm, normal S1-S2.  Respiratory: Lungs are clear to auscultation bilaterally.   Abdomen: Soft, nontender, nondistended.  Extremities: No unilateral calf swelling or calf tenderness to palpation.  Skin: warm and dry, no diaphoresis    ED Course     Labs Reviewed   COMP METABOLIC PANEL (14) - Abnormal; Notable for the following components:       Result Value    Glucose 120 (*)     All other components within normal limits   TROPONIN I HIGH SENSITIVITY - Normal   CBC WITH DIFFERENTIAL WITH PLATELET   BASIC METABOLIC PANEL (8)   CBC WITH DIFFERENTIAL WITH PLATELET   TROPONIN I HIGH SENSITIVITY   TROPONIN I HIGH SENSITIVITY   LIPID PANEL     EKG    Rate, intervals and axes as noted on EKG Report.  Rate: 66  Rhythm: Atrial Fibrillation  Reading: No ST elevation or depression.  QTc normal at 471.  Abnormal due to rhythm.         Chest x-ray: I personally reviewed the radiographs and my individual interpretation shows no pneumothorax.  I also reviewed the official report which showed left basilar opacity may represent atelectasis versus infiltrate.       Aspirin ordered       MDM      History is obtained from: Patient's wife    Previous records  reviewed as noted in HPI    Differential includes, but is not limited to, STEMI, pneumothorax, PE    Review of any laboratory testing: CBC CMP troponin normal     Review of any radiographic studies: Chest x-ray with left base atelectasis do not suspect pneumonia      Shared decision making with the patient.  Given age and risk factors patient will be admitted for cardiology consultation    Management of patient was discussed with : Dr. Vigil, hospitalist Dr. Saenz, cardiology and patient/family    The administration of these medications were addressed : Aspirin                         Admission disposition: 12/29/2024 10:55 PM           Medical Decision Making      Disposition and Plan     Clinical Impression:  1. Chest pain of uncertain etiology         Disposition:  Admit  12/29/2024 10:55 pm    Follow-up:  No follow-up provider specified.        Medications Prescribed:  Current Discharge Medication List              Supplementary Documentation:         Hospital Problems       Present on Admission  Date Reviewed: 1/13/2024            ICD-10-CM Noted POA    * (Principal) Chest pain of uncertain etiology R07.9 12/29/2024 Unknown    Azotemia R79.89 12/29/2024 Yes    Hyperglycemia R73.9 12/29/2024 Yes

## 2024-12-30 NOTE — PROGRESS NOTES
[facility]   part of [facility]     Hospitalist Progress Note     Adi Thomas Patient Status:  Observation    1945 MRN [id number]   Location [facility] 2NE-A Attending Ford Narayan*   Hosp Day # 0 PCP Kvng Eid MD     Chief Complaint: chest pain    Subjective:     Patient reports mid sternal chest discomfort. Onset yesterday while resting. No associated symptoms. Mild pain currently.     Objective:    Review of Systems:   A comprehensive review of systems was completed; pertinent positive and negatives stated in subjective.    Vital signs:  Temp:  [98.1 °F (36.7 °C)-98.3 °F (36.8 °C)] 98.2 °F (36.8 °C)  Pulse:  [52-72] 52  Resp:  [16-27] 22  BP: (118-157)/(59-88) 138/71  SpO2:  [95 %-98 %] 96 %    Physical Exam:    General: No acute distress  Respiratory: No wheezes, no rhonchi  Cardiovascular: S1, S2, regular rate and rhythm. Mid sternal tenderness to palpation  Abdomen: Soft, Non-tender, non-distended, positive bowel sounds  Neuro: No new focal deficits.   Extremities: No edema    Diagnostic Data:    Labs:  Recent Labs   Lab 24  0426   WBC 8.0 6.8   HGB 15.0 13.6   MCV 89.1 87.7   .0 193.0       Recent Labs   Lab 24  0426   * 102*   BUN 19 17   CREATSERUM 0.88 0.74   CA 9.3 8.8   ALB 4.3  --     141   K 3.6 3.5    106   CO2 29.0 28.0   ALKPHO 88  --    AST 23  --    ALT 18  --    BILT 0.5  --    TP 7.0  --        Estimated Glomerular Filtration Rate: 91.6 mL/min/1.73m2 (by CKD-EPI based on SCr of 0.74 mg/dL).    Recent Labs   Lab 24  0043 24  0426   TROPHS 25 27 28       No results for input(s): \"PTP\", \"INR\" in the last 168 hours.               Microbiology    No results found for this visit on 24.      Imaging: Reviewed in Epic.    Medications:    allopurinol  300 mg Oral Daily    amLODIPine  5 mg Oral Daily    atenolol  25 mg Oral Daily Beta Blocker    rivaroxaban  20 mg  Oral Nightly    ketorolac  15 mg Intravenous Once       Assessment & Plan:      #Chest pain, suspect non-cardiac pain   Costochondritis. Pain reproducible on exam  -HS troponin x 3 negative. EKG non-ischemic  -chest xray showing LLL opacity- atelectasis vs PNA -->pt afebrile with normal WBC and no cough making atelectasis more likely  -nuclear stress Feb 2019 normal  -cardiology c/s in ED  -Toradol x 1 dose      #A-fib  -cont home xarelto     #HTN  -cont home amlodipine, atenolol     #gout  -cont home allopurinol    Dominique Coffman MD    Supplementary Documentation:     Quality:  DVT Mechanical Prophylaxis:        DVT Pharmacologic Prophylaxis   Medication    rivaroxaban (Xarelto) tab 20 mg                Code Status: Not on file  Banks: No urinary catheter in place  Banks Duration (in days):   Central line:    FLOR:     Discharge is dependent on: course  At this point Mr. Thomas is expected to be discharge to: home    The 21st Century Cures Act makes medical notes like these available to patients in the interest of transparency. Please be advised this is a medical document. Medical documents are intended to carry relevant information, facts as evident, and the clinical opinion of the practitioner. The medical note is intended as peer to peer communication and may appear blunt or direct. It is written in medical language and may contain abbreviations or verbiage that are unfamiliar.

## 2024-12-30 NOTE — CONSULTS
[facility] Cardiology  Consultation Note      Adi Thomas Patient Status:  Observation    1945 MRN [id number]   Location [facility] 2NE-A Attending Ford Narayan*   Hosp Day # 0 PCP Kvng Eid MD     Reason for consult: Chest pain    Primary cardiologist: Jessica Briceno MD     History of Present Illness:  Adi Thomas is a 79 year old male who presented to [facility] on 2024 with chest pain. Began while seated at home - central sternal/substernal, nonradiating, off and on but improved after toradol today. No h/o exertional angina. Sees Dr. Briceno for Afib. Currently still feels slight discomfort, but low level. Troponin and EKG neg. No sig SOB, palps, edema, LH or syncope. No bleeding on Xarelto.     Medications:  Current Facility-Administered Medications   Medication Dose Route Frequency    allopurinol (Zyloprim) tab 300 mg  300 mg Oral Daily    amLODIPine (Norvasc) tab 5 mg  5 mg Oral Daily    atenolol (Tenormin) tab 25 mg  25 mg Oral Daily Beta Blocker    rivaroxaban (Xarelto) tab 20 mg  20 mg Oral Nightly    nitroglycerin (Nitrostat) SL tab 0.4 mg  0.4 mg Sublingual Q5 Min PRN    morphINE PF 2 MG/ML injection 2 mg  2 mg Intravenous Q4H PRN    acetaminophen (Tylenol Extra Strength) tab 500 mg  500 mg Oral Q4H PRN    melatonin tab 3 mg  3 mg Oral Nightly PRN    polyethylene glycol (PEG 3350) (Miralax) 17 g oral packet 17 g  17 g Oral Daily PRN    sennosides (Senokot) tab 17.2 mg  17.2 mg Oral Nightly PRN    bisacodyl (Dulcolax) 10 MG rectal suppository 10 mg  10 mg Rectal Daily PRN    fleet enema (Fleet) rectal enema 133 mL  1 enema Rectal Once PRN    ondansetron (Zofran) 4 MG/2ML injection 4 mg  4 mg Intravenous Q6H PRN    metoclopramide (Reglan) 5 mg/mL injection 10 mg  10 mg Intravenous Q8H PRN    benzonatate (Tessalon) cap 200 mg  200 mg Oral TID PRN    glycerin-hypromellose- (Artificial Tears) 0.2-0.2-1 % ophthalmic solution 1 drop  1 drop Both Eyes  QID PRN    sodium chloride (Saline Mist) 0.65 % nasal solution 1 spray  1 spray Each Nare Q3H PRN       Past Medical History:    Cellulitis    Chronic venous insufficiency    Essential hypertension    Gout    Obesity    PAD (peripheral artery disease) (HCC)    Paroxysmal atrial fibrillation (HCC)    Sleep apnea       Past Surgical History:   Procedure Laterality Date    Appendectomy      Cataract Bilateral     Hip replacement surgery Left     Knee replacement surgery Right 2001    Knee replacement surgery Left 2010       Family History  family history includes CHF in his father; Cancer in his brother and mother; Emphysema in his father; Heart Attack in his brother and father; Other in his mother; Pacemaker in his mother; Stroke in his mother.    Social History   reports that he quit smoking about 46 years ago. He has never used smokeless tobacco. He reports current alcohol use of about 12.0 standard drinks of alcohol per week. He reports that he does not use drugs.     Allergies  Allergies[1]    Review of Systems:  As per HPI, otherwise 10 point ROS is negative in detail.    Physical Exam:  Blood pressure 138/71, pulse 51, temperature 98.2 °F (36.8 °C), temperature source Oral, resp. rate 19, height 70\", weight 282 lb 10.1 oz (128.2 kg), SpO2 96%.  Temp (24hrs), Av.2 °F (36.8 °C), Min:98.1 °F (36.7 °C), Max:98.3 °F (36.8 °C)    Wt Readings from Last 3 Encounters:   24 282 lb 10.1 oz (128.2 kg)   24 280 lb (127 kg)   22 284 lb 12.8 oz (129.2 kg)       General: Awake and alert; in no acute distress  HEENT: Extraocular movements are intact; sclerae are anicteric; scalp is atraumatic  Neck: Supple; no JVD; no carotid bruits  Cardiac: Regular rate and regular rhythm; normal S1 and S2, no murmurs, rubs, or gallops are appreciated  Lungs: Clear to auscultation bilaterally; no accessory muscle use is noted, no wheezes, rhonci or rales  Abdomen: Soft, non-distended, non-tender; bowel sounds are  normoactive  Extremities: Warm, no edema, clubbing or cyanosis; moves all 4 extremities normally, distal pulses intact and equal  Psychiatric: Normal mood and affect; answers questions appropriately  Dermatologic: No rashes; normal skin turgor    Diagnostic testing:    Labs:   Lab Results   Component Value Date    INR 2.07 (H) 10/23/2019    INR 1.82 (H) 10/11/2019     Lab Results   Component Value Date    LDL 92 12/30/2024    HDL 34 12/30/2024    TRIG 80 12/30/2024    VLDL 13 12/30/2024     Lab Results   Component Value Date    WBC 6.8 12/30/2024    HGB 13.6 12/30/2024    HCT 40.0 12/30/2024    .0 12/30/2024    CREATSERUM 0.74 12/30/2024    BUN 17 12/30/2024     12/30/2024    K 3.5 12/30/2024     12/30/2024    CO2 28.0 12/30/2024     12/30/2024    CA 8.8 12/30/2024    ALB 4.3 12/29/2024    ALKPHO 88 12/29/2024    BILT 0.5 12/29/2024    TP 7.0 12/29/2024    AST 23 12/29/2024    ALT 18 12/29/2024       Cardiac diagnostics:    EKG 12/30/2024: Afib, RBBB    Echo 3/23/22:  1. Left ventricle: The cavity size is normal. Wall thickness is mildly      increased. Systolic function is at the lower limits of normal. The      estimated ejection fraction is 50-55%.   2. Aortic valve: Trileaflet; mildly thickened leaflets. Peak velocity (S):      1.3m/sec.   3. Aortic root: The aortic root is mildly dilated about 4.0 cm.   4. Mitral valve: Mildly calcified annulus. There is mild regurgitation.   5. Left atrium: The atrium is moderately dilated.   6. Right ventricle: Estimation of the right ventricular systolic pressure is      mildly increased. RV systolic pressure (S, est): 46mm Hg.   7. Right atrium: The atrium is mildly dilated.   8. Tricuspid valve: There is mild regurgitation.   9. Pericardium, extracardiac: There is no significant pericardial effusion.     Impression:  79 year old male presenting with chest pain, atypical features  Chronic AF on Xarelto  HTN  Obesity  ENDY CPAP  Mild pulm HTN - RVSP  46 3/22  Mildly dilated aortic root 4.0 cm 3/22    Recommendations:  Check Lexiscan MPI and echo. Last stress test 2019 normal.   AF rate controlled on atenolol  Continue Xarelto   BP controlled on norvasc    Thank you for allowing our practice to participate in the care of your patient. Please do not hesitate to contact me if you have any questions.    Malachi Mayfield MD  Interventional Cardiology  [facility]  Office: [phone]         [1]   Allergies  Allergen Reactions    Metoprolol UNKNOWN     Bad nightmares per wife.

## 2024-12-30 NOTE — PLAN OF CARE
Acquired patient around 0730. Alert and oriented x4. On Ra. SpO2 within normal limits. Pt denies sob. Pt does of mid sternal cp rating 2/10, can get sharp but subsides. Afib on tele. HR 30s-60s. Continent of bowel and bladder. Plan for cardiology to see. Call light within reach. Continue plan of care.     Problem: Patient/Family Goals  Goal: Patient/Family Long Term Goal  Description: Patient's Long Term Goal: Stay out of hospital    Interventions:  - Follow up with PCP after discharge  -Take medication as prescribed  - See additional Care Plan goals for specific interventions  Outcome: Progressing  Goal: Patient/Family Short Term Goal  Description: Patient's Short Term Goal: Go home    Interventions:   - Test ordered  - Monitor labs  - Monitor on tele  - See additional Care Plan goals for specific interventions  Outcome: Progressing     Problem: CARDIOVASCULAR - ADULT  Goal: Maintains optimal cardiac output and hemodynamic stability  Description: INTERVENTIONS:  - Monitor vital signs, rhythm, and trends  - Monitor for bleeding, hypotension and signs of decreased cardiac output  - Evaluate effectiveness of vasoactive medications to optimize hemodynamic stability  - Monitor arterial and/or venous puncture sites for bleeding and/or hematoma  - Assess quality of pulses, skin color and temperature  - Assess for signs of decreased coronary artery perfusion - ex. Angina  - Evaluate fluid balance, assess for edema, trend weights  Outcome: Progressing  Goal: Absence of cardiac arrhythmias or at baseline  Description: INTERVENTIONS:  - Continuous cardiac monitoring, monitor vital signs, obtain 12 lead EKG if indicated  - Evaluate effectiveness of antiarrhythmic and heart rate control medications as ordered  - Initiate emergency measures for life threatening arrhythmias  - Monitor electrolytes and administer replacement therapy as ordered  Outcome: Progressing

## 2024-12-30 NOTE — PLAN OF CARE
NURSING ADMISSION NOTE    Patient admitted via EMS from  ED  Oriented to room  Safety precautions initiated  Bed in low position  Call light within reach    Patient alert and oriented x 4. Up  SBA. On RA, Cpap when sleeping. Afib/Quinten on tele; with the lowest recorded HR 36, asymptomatic Continent of bowel and bladder. Reports of chest pain 2/10 with deep breaths. POC : trend troponin, cards to see. Fall precautions in place. Call light within reach.    Problem: Patient/Family Goals  Goal: Patient/Family Long Term Goal  Description: Patient's Long Term Goal: Stay out of hospital    Interventions:  - Follow up with PCP after discharge  -Take medication as prescribed  - See additional Care Plan goals for specific interventions  Outcome: Progressing  Goal: Patient/Family Short Term Goal  Description: Patient's Short Term Goal: Go home    Interventions:   - Test ordered  - Monitor labs  - Monitor on tele  - See additional Care Plan goals for specific interventions  Outcome: Progressing     Problem: CARDIOVASCULAR - ADULT  Goal: Maintains optimal cardiac output and hemodynamic stability  Description: INTERVENTIONS:  - Monitor vital signs, rhythm, and trends  - Monitor for bleeding, hypotension and signs of decreased cardiac output  - Evaluate effectiveness of vasoactive medications to optimize hemodynamic stability  - Monitor arterial and/or venous puncture sites for bleeding and/or hematoma  - Assess quality of pulses, skin color and temperature  - Assess for signs of decreased coronary artery perfusion - ex. Angina  - Evaluate fluid balance, assess for edema, trend weights  Outcome: Progressing  Goal: Absence of cardiac arrhythmias or at baseline  Description: INTERVENTIONS:  - Continuous cardiac monitoring, monitor vital signs, obtain 12 lead EKG if indicated  - Evaluate effectiveness of antiarrhythmic and heart rate control medications as ordered  - Initiate emergency measures for life threatening arrhythmias  -  Monitor electrolytes and administer replacement therapy as ordered  Outcome: Progressing

## 2024-12-30 NOTE — ED QUICK NOTES
Orders for admission, patient is aware of plan and ready to go upstairs. Any questions, please call ED RN Cynthia at extension [phone].     Patient Covid vaccination status: Unvaccinated     COVID Test Ordered in ED: None    COVID Suspicion at Admission: N/A    Running Infusions:  None    Mental Status/LOC at time of transport: a&ox3    Other pertinent information:   CIWA score: N/A   NIH score:  N/A

## 2024-12-30 NOTE — ED INITIAL ASSESSMENT (HPI)
Chest pain that began at 2000 today across his chest pain,  pt says he was doing \"nothing\" when this started. When lying down, pt states he can feel it between his shoulder blades. No sob.

## 2024-12-31 ENCOUNTER — APPOINTMENT (OUTPATIENT)
Dept: CV DIAGNOSTICS | Facility: HOSPITAL | Age: 79
End: 2024-12-31
Attending: INTERNAL MEDICINE
Payer: MEDICARE

## 2024-12-31 VITALS
OXYGEN SATURATION: 95 % | WEIGHT: 282.63 LBS | TEMPERATURE: 98 F | SYSTOLIC BLOOD PRESSURE: 145 MMHG | HEIGHT: 70 IN | DIASTOLIC BLOOD PRESSURE: 63 MMHG | BODY MASS INDEX: 40.46 KG/M2 | RESPIRATION RATE: 19 BRPM | HEART RATE: 62 BPM

## 2024-12-31 PROCEDURE — 93306 TTE W/DOPPLER COMPLETE: CPT | Performed by: INTERNAL MEDICINE

## 2024-12-31 PROCEDURE — 93017 CV STRESS TEST TRACING ONLY: CPT | Performed by: INTERNAL MEDICINE

## 2024-12-31 PROCEDURE — 78452 HT MUSCLE IMAGE SPECT MULT: CPT | Performed by: INTERNAL MEDICINE

## 2024-12-31 PROCEDURE — 99239 HOSP IP/OBS DSCHRG MGMT >30: CPT | Performed by: INTERNAL MEDICINE

## 2024-12-31 PROCEDURE — 93018 CV STRESS TEST I&R ONLY: CPT | Performed by: INTERNAL MEDICINE

## 2024-12-31 RX ORDER — REGADENOSON 0.08 MG/ML
INJECTION, SOLUTION INTRAVENOUS
Status: COMPLETED
Start: 2024-12-31 | End: 2024-12-31

## 2024-12-31 NOTE — PLAN OF CARE
Patient is alert & oriented x 4, on room air, CPAP at night. Up standby assist. Afib on tele monitor. Continent of bowel and bladder. Patient denies any pain, shortness of breath, or chest pain/discomfort. Plan for Lexiscan and Echo tomorrow. Updated on plan of care and verbalized understanding. Fall precautions in place. Call light in reach.    Problem: CARDIOVASCULAR - ADULT  Goal: Maintains optimal cardiac output and hemodynamic stability  Description: INTERVENTIONS:  - Monitor vital signs, rhythm, and trends  - Monitor for bleeding, hypotension and signs of decreased cardiac output  - Evaluate effectiveness of vasoactive medications to optimize hemodynamic stability  - Monitor arterial and/or venous puncture sites for bleeding and/or hematoma  - Assess quality of pulses, skin color and temperature  - Assess for signs of decreased coronary artery perfusion - ex. Angina  - Evaluate fluid balance, assess for edema, trend weights  Outcome: Progressing  Goal: Absence of cardiac arrhythmias or at baseline  Description: INTERVENTIONS:  - Continuous cardiac monitoring, monitor vital signs, obtain 12 lead EKG if indicated  - Evaluate effectiveness of antiarrhythmic and heart rate control medications as ordered  - Initiate emergency measures for life threatening arrhythmias  - Monitor electrolytes and administer replacement therapy as ordered  Outcome: Progressing

## 2024-12-31 NOTE — DISCHARGE SUMMARY
[facility]  DISCHARGE SUMMARY     Adi Thomas Patient Status:  Observation    1945 MRN [id number]   Location [facility] 2NE-A Attending Ford Narayan*   Hosp Day # 0 PCP Kvng Eid MD     Date of Admission: 2024  Date of Discharge:   2024    Discharge Disposition: Home or Self Care    Discharge Diagnosis:  Atypical chest pain  Costochondritis   Afib  Hypertension  Gout  Obesity, BMI 40.55    History of Present Illness:   79 year old male with PMHx HTN/ PAD/ ENDY/ A-fib who presented to the hospital for chest pain. His pain began tonight when he was sitting down. It was a sharp pain across his anterior chest rated 0-6/10 which was worse with deep breaths and movement and decreased when sitting still. He denied any recent heavy lifting. He denied any associated dyspnea, diaphoresis, palpitations, lightheadedness, nausea.     Brief Synopsis:   #Atypical chest pain   #Costochondritis   Atypical, reproducible pain on exam. Chest xray showing LLL opacity- atelectasis vs PNA but patient without symptoms. TTE and nuc med stress test negative. Pain improved with toradol     Lace+ Score: 39  59-90 High Risk  29-58 Medium Risk  0-28   Low Risk       TCM Follow-Up Recommendation:  LACE 29-58: Moderate Risk of readmission after discharge from the hospital.      Procedures during hospitalization:   NA    Incidental or significant findings and recommendations (brief descriptions):  NA    Lab/Test results pending at Discharge:   NA    Consultants:  Cardiology     Discharge Medication List:     Discharge Medications        CHANGE how you take these medications        Instructions Prescription details   rivaroxaban 20 MG Tabs  Commonly known as: Xarelto  What changed: when to take this      Take 1 tablet (20 mg total) by mouth daily with food.   Quantity: 90 tablet  Refills: 3            CONTINUE taking these medications        Instructions Prescription details   allopurinol 300 MG  Tabs  Commonly known as: Zyloprim      Take 1 tablet (300 mg total) by mouth daily.   Refills: 0     amLODIPine 5 MG Tabs  Commonly known as: Norvasc      Take 1 tablet (5 mg total) by mouth daily.   Quantity: 30 tablet  Refills: 5     atenolol 25 MG Tabs  Commonly known as: Tenormin      Take 1 tablet (25 mg total) by mouth daily.   Quantity: 15 tablet  Refills: 11     colchicine 0.6 MG Tabs      Take 1 tablet (0.6 mg total) by mouth as needed.   Refills: 0              [place]    Follow-up appointment:   Kvng Eid MD  [address]  [facility] [address]  [phone]    Call in 1 week(s)  Hospital follow up    Appointments for Next 30 Days 2024 - 2025      None            Vital signs:  Temp:  [97.2 °F (36.2 °C)-98.2 °F (36.8 °C)] 97.9 °F (36.6 °C)  Pulse:  [47-62] 62  Resp:  [17-22] 19  BP: (106-149)/(63-85) 145/63  SpO2:  [90 %-98 %] 95 %    Physical Exam:    General: No acute distress   Lungs: clear to auscultation  Cardiovascular: S1, S2  Abdomen: Soft    -----------------------------------------------------------------------------------------------  PATIENT DISCHARGE INSTRUCTIONS: See electronic chart    Dominique Coffman MD    Total time spent on discharge plannin minutes     The  Cent[employer] Cures Act makes medical notes like these available to patients in the interest of transparency. Please be advised this is a medical document. Medical documents are intended to carry relevant information, facts as evident, and the clinical opinion of the practitioner. The medical note is intended as peer to peer communication and may appear blunt or direct. It is written in medical language and may contain abbreviations or verbiage that are unfamiliar.

## 2024-12-31 NOTE — PLAN OF CARE
Acquired patient around 0730. Alert and oriented x4. On Ra. SpO2 within normal limits. Pt denies sob or cp at this time Afib on tele. HR 30s-60s. Continent of bowel and bladder. Plan for echo and stress test today. Call light within reach. Continue plan of care.     Problem: Patient/Family Goals  Goal: Patient/Family Long Term Goal  Description: Patient's Long Term Goal: Stay out of hospital    Interventions:  - Follow up with PCP after discharge  -Take medication as prescribed  - See additional Care Plan goals for specific interventions  Outcome: Progressing  Goal: Patient/Family Short Term Goal  Description: Patient's Short Term Goal: Go home    Interventions:   - Test ordered  - Monitor labs  - Monitor on tele  - See additional Care Plan goals for specific interventions  Outcome: Progressing     Problem: CARDIOVASCULAR - ADULT  Goal: Maintains optimal cardiac output and hemodynamic stability  Description: INTERVENTIONS:  - Monitor vital signs, rhythm, and trends  - Monitor for bleeding, hypotension and signs of decreased cardiac output  - Evaluate effectiveness of vasoactive medications to optimize hemodynamic stability  - Monitor arterial and/or venous puncture sites for bleeding and/or hematoma  - Assess quality of pulses, skin color and temperature  - Assess for signs of decreased coronary artery perfusion - ex. Angina  - Evaluate fluid balance, assess for edema, trend weights  Outcome: Progressing  Goal: Absence of cardiac arrhythmias or at baseline  Description: INTERVENTIONS:  - Continuous cardiac monitoring, monitor vital signs, obtain 12 lead EKG if indicated  - Evaluate effectiveness of antiarrhythmic and heart rate control medications as ordered  - Initiate emergency measures for life threatening arrhythmias  - Monitor electrolytes and administer replacement therapy as ordered  Outcome: Progressing

## 2024-12-31 NOTE — PROGRESS NOTES
CARDIODIAGNOSTIC PRELIMINARY REPORT:  LEXISCAN completed, tolerated well   Second set of images pending

## 2024-12-31 NOTE — PLAN OF CARE
Stress test results as reported by Radiology:     EF 56%    Perfusion: No perfusion abnormalities    Wall motion: No wall motion abnormalities     Left Ventricle mildly dilated with volume of 118 ml.     Results reported to ordering provider for continuation of care.

## 2024-12-31 NOTE — PROGRESS NOTES
Reviewed discharge plan of care with patient and family. Verbalize understanding. IV removed. Tele removed. Staff escort. All questions answered.

## 2025-01-01 NOTE — PROGRESS NOTES
[facility] Cardiology  Consultation Note      Adi Thoams Patient Status:  Observation    1945 MRN [id number]   Location [facility] 2NE-A Attending Ford Narayan*   Hosp Day # 0 PCP Kvng Eid MD     Reason for consult: Chest pain    Events: Stable. Denies CP or SOB.     Primary cardiologist: Jessica Briceno MD     History of Present Illness:  Adi Thomas is a 79 year old male who presented to [facility] on 2024 with chest pain. Began while seated at home - central sternal/substernal, nonradiating, off and on but improved after toradol today. No h/o exertional angina. Sees Dr. Briceno for Afib. Currently still feels slight discomfort, but low level. Troponin and EKG neg. No sig SOB, palps, edema, LH or syncope. No bleeding on Xarelto.     Medications:  No current facility-administered medications for this encounter.       Past Medical History:    Cellulitis    Chronic venous insufficiency    Essential hypertension    Gout    Obesity    PAD (peripheral artery disease) (HCC)    Paroxysmal atrial fibrillation (HCC)    Sleep apnea       Past Surgical History:   Procedure Laterality Date    Appendectomy      Cataract Bilateral     Hip replacement surgery Left 2014    Knee replacement surgery Right 2001    Knee replacement surgery Left 2010       Family History  family history includes CHF in his father; Cancer in his brother and mother; Emphysema in his father; Heart Attack in his brother and father; Other in his mother; Pacemaker in his mother; Stroke in his mother.    Social History   reports that he quit smoking about 46 years ago. He has never used smokeless tobacco. He reports current alcohol use of about 12.0 standard drinks of alcohol per week. He reports that he does not use drugs.     Allergies  Allergies[1]    Review of Systems:  As per HPI, otherwise 10 point ROS is negative in detail.    Physical Exam:  Blood pressure 145/63, pulse 62, temperature 97.9 °F  (36.6 °C), temperature source Oral, resp. rate 19, height 70\", weight 282 lb 10.1 oz (128.2 kg), SpO2 95%.  Temp (24hrs), Av.5 °F (36.4 °C), Min:97.2 °F (36.2 °C), Max:97.9 °F (36.6 °C)    Wt Readings from Last 3 Encounters:   24 282 lb 10.1 oz (128.2 kg)   24 280 lb (127 kg)   22 284 lb 12.8 oz (129.2 kg)       General: Awake and alert; in no acute distress  HEENT: Extraocular movements are intact; sclerae are anicteric; scalp is atraumatic  Neck: Supple; no JVD; no carotid bruits  Cardiac: Regular rate and regular rhythm; normal S1 and S2, no murmurs, rubs, or gallops are appreciated  Lungs: Clear to auscultation bilaterally; no accessory muscle use is noted, no wheezes, rhonci or rales  Abdomen: Soft, non-distended, non-tender; bowel sounds are normoactive  Extremities: Warm, no edema, clubbing or cyanosis; moves all 4 extremities normally, distal pulses intact and equal  Psychiatric: Normal mood and affect; answers questions appropriately  Dermatologic: No rashes; normal skin turgor    Diagnostic testing:    Labs:   Lab Results   Component Value Date    INR 2.07 (H) 10/23/2019    INR 1.82 (H) 10/11/2019                 Cardiac diagnostics:    EKG 2024: Afib, RBBB    Echo 3/23/22:  1. Left ventricle: The cavity size is normal. Wall thickness is mildly      increased. Systolic function is at the lower limits of normal. The      estimated ejection fraction is 50-55%.   2. Aortic valve: Trileaflet; mildly thickened leaflets. Peak velocity (S):      1.3m/sec.   3. Aortic root: The aortic root is mildly dilated about 4.0 cm.   4. Mitral valve: Mildly calcified annulus. There is mild regurgitation.   5. Left atrium: The atrium is moderately dilated.   6. Right ventricle: Estimation of the right ventricular systolic pressure is      mildly increased. RV systolic pressure (S, est): 46mm Hg.   7. Right atrium: The atrium is mildly dilated.   8. Tricuspid valve: There is mild regurgitation.   9.  Pericardium, extracardiac: There is no significant pericardial effusion.     Impression:  79 year old male presenting with chest pain, atypical features  Chronic AF on Xarelto  HTN  Obesity  ENDY CPAP  Mild pulm HTN - RVSP 46 3/22  Mildly dilated aortic root 4.2 cm     Recommendations:  Check Lexiscan MPI -> normal perfusion  Echo reviewed    AF rate controlled on atenolol  Continue Xarelto   BP controlled on norvasc  Ok for dc. F/u Dr. Briceno 2-3 weeks.     Thank you for allowing our practice to participate in the care of your patient. Please do not hesitate to contact me if you have any questions.    Malachi Mayfield MD  Interventional Cardiology  [facility]  Office: [phone]         [1]   Allergies  Allergen Reactions    Metoprolol UNKNOWN     Bad nightmares per wife.

## (undated) NOTE — ED AVS SNAPSHOT
Mr. Jolly Briseno   MRN: [id number]    Department:  [facility] Emergency Department in [place]   Date of Visit:  10/11/2019           Disclosure     Insurance plans vary and the physician(s) referred by the ER may not be covered by your plan.  Please co tell this physician (or your personal doctor if your instructions are to return to your personal doctor) about any new or lasting problems. The primary care or specialist physician will see patients referred from the [facility] Emergency Department.  Mian Rain

## (undated) NOTE — ED AVS SNAPSHOT
Mr. Afua Geronimo   MRN: [id number]    Department:  THE [facility] Emergency Department in [place]   Date of Visit:  10/23/2019           Disclosure     Insurance plans vary and the physician(s) referred by the ER may not be covered by your plan.  Please co tell this physician (or your personal doctor if your instructions are to return to your personal doctor) about any new or lasting problems. The primary care or specialist physician will see patients referred from the [facility] Emergency Department.  Braden Duran